# Patient Record
Sex: MALE | Race: OTHER | HISPANIC OR LATINO | URBAN - METROPOLITAN AREA
[De-identification: names, ages, dates, MRNs, and addresses within clinical notes are randomized per-mention and may not be internally consistent; named-entity substitution may affect disease eponyms.]

---

## 2020-03-31 ENCOUNTER — EMERGENCY (EMERGENCY)
Facility: HOSPITAL | Age: 55
LOS: 1 days | Discharge: HOME | End: 2020-03-31
Attending: EMERGENCY MEDICINE
Payer: MEDICAID

## 2020-03-31 VITALS
HEART RATE: 113 BPM | WEIGHT: 199.96 LBS | SYSTOLIC BLOOD PRESSURE: 156 MMHG | TEMPERATURE: 101 F | DIASTOLIC BLOOD PRESSURE: 94 MMHG | OXYGEN SATURATION: 96 % | RESPIRATION RATE: 20 BRPM | HEIGHT: 65 IN

## 2020-03-31 PROCEDURE — 99283 EMERGENCY DEPT VISIT LOW MDM: CPT

## 2020-03-31 NOTE — ED ADULT TRIAGE NOTE - CHIEF COMPLAINT QUOTE
Pt compliant of fever and headache for 5 days. Pt stated he saw his doctor on Saturday for same compliant of fever and headache and he was given medicine but it is not working.

## 2020-04-01 VITALS
HEART RATE: 95 BPM | OXYGEN SATURATION: 96 % | RESPIRATION RATE: 19 BRPM | DIASTOLIC BLOOD PRESSURE: 82 MMHG | SYSTOLIC BLOOD PRESSURE: 125 MMHG | TEMPERATURE: 99 F

## 2020-04-01 PROCEDURE — 99283 EMERGENCY DEPT VISIT LOW MDM: CPT

## 2020-04-01 RX ORDER — ACETAMINOPHEN 500 MG
2 TABLET ORAL
Qty: 30 | Refills: 0
Start: 2020-04-01

## 2020-04-01 RX ORDER — IBUPROFEN 200 MG
600 TABLET ORAL ONCE
Refills: 0 | Status: COMPLETED | OUTPATIENT
Start: 2020-04-01 | End: 2020-04-01

## 2020-04-01 RX ORDER — IBUPROFEN 200 MG
1 TABLET ORAL
Qty: 20 | Refills: 0
Start: 2020-04-01 | End: 2020-04-05

## 2020-04-01 RX ORDER — ACETAMINOPHEN 500 MG
975 TABLET ORAL ONCE
Refills: 0 | Status: COMPLETED | OUTPATIENT
Start: 2020-04-01 | End: 2020-04-01

## 2020-04-01 RX ADMIN — Medication 975 MILLIGRAM(S): at 00:44

## 2020-04-01 RX ADMIN — Medication 600 MILLIGRAM(S): at 00:44

## 2020-04-01 NOTE — ED ADULT NURSE NOTE - OBJECTIVE STATEMENT
The patient presented with body pain, and fever.  He received tylenol and ibuprofen po with relief.  He is breathing at ease with no hypoxia.

## 2020-04-01 NOTE — ED ADULT NURSE NOTE - NSIMPLEMENTINTERV_GEN_ALL_ED
Implemented All Universal Safety Interventions:  [place] to call system. Call bell, personal items and telephone within reach. Instruct patient to call for assistance. Room bathroom lighting operational. Non-slip footwear when patient is off stretcher. Physically safe environment: no spills, clutter or unnecessary equipment. Stretcher in lowest position, wheels locked, appropriate side rails in place.

## 2020-04-01 NOTE — ED PROVIDER NOTE - OBJECTIVE STATEMENT
53 y/o healthy M here with 5 days of fevers, headaches, body aches, and cough.  Pt reports he saw his PMD for these symptoms and was given a medication (does not remember name) which he has been taking without relief.  No known sick contacts.  No recent travel.  Denies vision changes, weakness, neck pain or stiffness, cp, SOB, abd pain, n/v/d.

## 2020-04-01 NOTE — ED PROVIDER NOTE - PATIENT PORTAL LINK FT
You can access the FollowMyHealth Patient Portal offered by [facility] by registering at the following website: [email or web address]. By joining [employer]’s FollowMyHealth portal, you will also be able to view your health information using other applications (apps) compatible with our system.

## 2020-04-01 NOTE — ED PROVIDER NOTE - NSFOLLOWUPINSTRUCTIONS_ED_ALL_ED_FT
Drink plenty of fluids.  You can take ibuprofen 600mg every 6 hours or Tylenol 650mg every 4 hours as needed for pain or fever.  Follow-up with your PMD in 24-48 hours.      You were not tested for Coronavirus today as you do not require medical admission, but given your symptoms it is presumed that you have Covid-19 Coronavirus.  Your oxygen level was normal at rest and with ambulation, which is assuring.  Return to the emergency department for any new or worsening symptoms, including but not limited to inability to catch breath/inability to walk without marked shortness of breath or light-headedness, neck pain/stiffness, confusion, inability to tolerate oral intake, or passing out/fainting.    Please avoid any close or prolonged contact with other individuals, in particular: infants/elderly or individuals who are known to be immune suppressed.  Please self-quarantine and isolate yourself from others for 14 days.      You received verbal instructions and did not sign because of the risk of infection, and expressed understanding of the discharge instructions. Please followup with your doctor (call) and consider follow up in their office. Please practice good hand hygiene and social distancing. If fever, cough, shortness of breath, or any worse symptoms return to the ER.

## 2020-04-01 NOTE — ED PROVIDER NOTE - CLINICAL SUMMARY MEDICAL DECISION MAKING FREE TEXT BOX
53 y/o M with 5 days of headaches, body aches, fever, mild cough.  Pt saw his PMD who prescribed him a medication which he takes twice a day without relief.  No focal neuro deficits, no nuchal rigidity to suggest meningitis.  Will treat headache, likely in setting of viral illness, poss covid given prevalence in community.  Will reassess for outpatient supportive care and self-quarantine.

## 2020-11-16 ENCOUNTER — EMERGENCY (EMERGENCY)
Facility: HOSPITAL | Age: 55
LOS: 1 days | Discharge: HOME | End: 2020-11-16
Attending: EMERGENCY MEDICINE
Payer: MEDICAID

## 2020-11-16 VITALS
HEIGHT: 65 IN | WEIGHT: 192.24 LBS | HEART RATE: 98 BPM | SYSTOLIC BLOOD PRESSURE: 153 MMHG | DIASTOLIC BLOOD PRESSURE: 93 MMHG | OXYGEN SATURATION: 97 % | RESPIRATION RATE: 18 BRPM | TEMPERATURE: 99 F

## 2020-11-16 VITALS
RESPIRATION RATE: 17 BRPM | OXYGEN SATURATION: 97 % | HEART RATE: 87 BPM | DIASTOLIC BLOOD PRESSURE: 87 MMHG | TEMPERATURE: 98 F | SYSTOLIC BLOOD PRESSURE: 143 MMHG

## 2020-11-16 LAB
ALBUMIN SERPL ELPH-MCNC: 3.8 G/DL — SIGNIFICANT CHANGE UP (ref 3.5–5)
ALP SERPL-CCNC: 79 U/L — SIGNIFICANT CHANGE UP (ref 40–120)
ALT FLD-CCNC: 42 U/L DA — SIGNIFICANT CHANGE UP (ref 10–60)
ANION GAP SERPL CALC-SCNC: 9 MMOL/L — SIGNIFICANT CHANGE UP (ref 5–17)
AST SERPL-CCNC: 17 U/L — SIGNIFICANT CHANGE UP (ref 10–40)
BASOPHILS # BLD AUTO: 0.03 K/UL — SIGNIFICANT CHANGE UP (ref 0–0.2)
BASOPHILS NFR BLD AUTO: 0.5 % — SIGNIFICANT CHANGE UP (ref 0–2)
BILIRUB SERPL-MCNC: 0.5 MG/DL — SIGNIFICANT CHANGE UP (ref 0.2–1.2)
BUN SERPL-MCNC: 18 MG/DL — SIGNIFICANT CHANGE UP (ref 7–18)
CALCIUM SERPL-MCNC: 9 MG/DL — SIGNIFICANT CHANGE UP (ref 8.4–10.5)
CHLORIDE SERPL-SCNC: 103 MMOL/L — SIGNIFICANT CHANGE UP (ref 96–108)
CO2 SERPL-SCNC: 26 MMOL/L — SIGNIFICANT CHANGE UP (ref 22–31)
CREAT SERPL-MCNC: 0.86 MG/DL — SIGNIFICANT CHANGE UP (ref 0.5–1.3)
D DIMER BLD IA.RAPID-MCNC: 150 NG/ML DDU — SIGNIFICANT CHANGE UP
EOSINOPHIL # BLD AUTO: 0.39 K/UL — SIGNIFICANT CHANGE UP (ref 0–0.5)
EOSINOPHIL NFR BLD AUTO: 7 % — HIGH (ref 0–6)
GLUCOSE SERPL-MCNC: 101 MG/DL — HIGH (ref 70–99)
HCT VFR BLD CALC: 45.1 % — SIGNIFICANT CHANGE UP (ref 39–50)
HGB BLD-MCNC: 14.8 G/DL — SIGNIFICANT CHANGE UP (ref 13–17)
IMM GRANULOCYTES NFR BLD AUTO: 0.2 % — SIGNIFICANT CHANGE UP (ref 0–1.5)
LYMPHOCYTES # BLD AUTO: 1.88 K/UL — SIGNIFICANT CHANGE UP (ref 1–3.3)
LYMPHOCYTES # BLD AUTO: 33.6 % — SIGNIFICANT CHANGE UP (ref 13–44)
MCHC RBC-ENTMCNC: 29.5 PG — SIGNIFICANT CHANGE UP (ref 27–34)
MCHC RBC-ENTMCNC: 32.8 GM/DL — SIGNIFICANT CHANGE UP (ref 32–36)
MCV RBC AUTO: 89.8 FL — SIGNIFICANT CHANGE UP (ref 80–100)
MONOCYTES # BLD AUTO: 1.03 K/UL — HIGH (ref 0–0.9)
MONOCYTES NFR BLD AUTO: 18.4 % — HIGH (ref 2–14)
NEUTROPHILS # BLD AUTO: 2.25 K/UL — SIGNIFICANT CHANGE UP (ref 1.8–7.4)
NEUTROPHILS NFR BLD AUTO: 40.3 % — LOW (ref 43–77)
NRBC # BLD: 0 /100 WBCS — SIGNIFICANT CHANGE UP (ref 0–0)
PLATELET # BLD AUTO: 243 K/UL — SIGNIFICANT CHANGE UP (ref 150–400)
POTASSIUM SERPL-MCNC: 4.2 MMOL/L — SIGNIFICANT CHANGE UP (ref 3.5–5.3)
POTASSIUM SERPL-SCNC: 4.2 MMOL/L — SIGNIFICANT CHANGE UP (ref 3.5–5.3)
PROT SERPL-MCNC: 7.8 G/DL — SIGNIFICANT CHANGE UP (ref 6–8.3)
RBC # BLD: 5.02 M/UL — SIGNIFICANT CHANGE UP (ref 4.2–5.8)
RBC # FLD: 11.9 % — SIGNIFICANT CHANGE UP (ref 10.3–14.5)
SARS-COV-2 RNA SPEC QL NAA+PROBE: SIGNIFICANT CHANGE UP
SODIUM SERPL-SCNC: 138 MMOL/L — SIGNIFICANT CHANGE UP (ref 135–145)
TROPONIN I SERPL-MCNC: <0.015 NG/ML — SIGNIFICANT CHANGE UP (ref 0–0.04)
WBC # BLD: 5.59 K/UL — SIGNIFICANT CHANGE UP (ref 3.8–10.5)
WBC # FLD AUTO: 5.59 K/UL — SIGNIFICANT CHANGE UP (ref 3.8–10.5)

## 2020-11-16 PROCEDURE — 96374 THER/PROPH/DIAG INJ IV PUSH: CPT

## 2020-11-16 PROCEDURE — 96375 TX/PRO/DX INJ NEW DRUG ADDON: CPT

## 2020-11-16 PROCEDURE — 70450 CT HEAD/BRAIN W/O DYE: CPT | Mod: 26

## 2020-11-16 PROCEDURE — 99284 EMERGENCY DEPT VISIT MOD MDM: CPT

## 2020-11-16 PROCEDURE — 71046 X-RAY EXAM CHEST 2 VIEWS: CPT | Mod: 26

## 2020-11-16 PROCEDURE — 87635 SARS-COV-2 COVID-19 AMP PRB: CPT

## 2020-11-16 PROCEDURE — 85379 FIBRIN DEGRADATION QUANT: CPT

## 2020-11-16 PROCEDURE — 93005 ELECTROCARDIOGRAM TRACING: CPT

## 2020-11-16 PROCEDURE — 71046 X-RAY EXAM CHEST 2 VIEWS: CPT

## 2020-11-16 PROCEDURE — 85025 COMPLETE CBC W/AUTO DIFF WBC: CPT

## 2020-11-16 PROCEDURE — 70450 CT HEAD/BRAIN W/O DYE: CPT

## 2020-11-16 PROCEDURE — 99284 EMERGENCY DEPT VISIT MOD MDM: CPT | Mod: 25

## 2020-11-16 PROCEDURE — 36415 COLL VENOUS BLD VENIPUNCTURE: CPT

## 2020-11-16 PROCEDURE — 84484 ASSAY OF TROPONIN QUANT: CPT

## 2020-11-16 PROCEDURE — 80053 COMPREHEN METABOLIC PANEL: CPT

## 2020-11-16 RX ORDER — KETOROLAC TROMETHAMINE 30 MG/ML
30 SYRINGE (ML) INJECTION ONCE
Refills: 0 | Status: DISCONTINUED | OUTPATIENT
Start: 2020-11-16 | End: 2020-11-16

## 2020-11-16 RX ORDER — MECLIZINE HCL 12.5 MG
25 TABLET ORAL ONCE
Refills: 0 | Status: COMPLETED | OUTPATIENT
Start: 2020-11-16 | End: 2020-11-16

## 2020-11-16 RX ORDER — ACETAMINOPHEN 500 MG
650 TABLET ORAL ONCE
Refills: 0 | Status: COMPLETED | OUTPATIENT
Start: 2020-11-16 | End: 2020-11-16

## 2020-11-16 RX ORDER — METOCLOPRAMIDE HCL 10 MG
10 TABLET ORAL ONCE
Refills: 0 | Status: COMPLETED | OUTPATIENT
Start: 2020-11-16 | End: 2020-11-16

## 2020-11-16 RX ADMIN — Medication 25 MILLIGRAM(S): at 20:05

## 2020-11-16 RX ADMIN — Medication 30 MILLIGRAM(S): at 20:04

## 2020-11-16 RX ADMIN — Medication 650 MILLIGRAM(S): at 17:24

## 2020-11-16 RX ADMIN — Medication 10 MILLIGRAM(S): at 17:25

## 2020-11-16 NOTE — ED PROVIDER NOTE - CHPI ED SYMPTOMS NEG
no changes in vision, no changes in hearing, no shortness of breath, no cough, no vomiting, no diarrhea

## 2020-11-16 NOTE — ED PROVIDER NOTE - NSFOLLOWUPINSTRUCTIONS_ED_ALL_ED_FT
Dolor de pecho inespecífico en los adultos    Nonspecific Chest Pain, Adult      El dolor de pecho puede deberse a muchas afecciones diferentes. Puede ser provocado por brodie afección que es potencialmente mortal y requiere tratamiento hospitalario de inmediato. También puede ser provocado por algo que no es potencialmente mortal. Si tiene dolor de pecho, puede ser difícil saber la diferencia, por lo tanto es importante que obtenga ayuda de inmediato para asegurarse de que no tiene brodie afección grave.  Algunas causas potencialmente mortales del dolor de pecho incluyen:  •Infarto de miocardio.      •Un desgarro en el vaso sanguíneo principal del cuerpo (disección aórtica).      •Inflamación alrededor del corazón (pericarditis).      •Un problema en los pulmones, daquan un coágulo de denise (embolia pulmonar) o un pulmón colapsado (neumotórax).    Algunas causas de dolor de pecho que no son potencialmente mortales incluyen:  •Acidez estomacal.      •Ansiedad o estrés.      •Daño de los huesos, los músculos y los cartílagos que conforman la pared torácica.      •Neumonía o bronquitis.      •Culebrilla (virus de la varicela zóster).    El dolor de pecho puede provocar las siguientes sensaciones:  •Dolor o molestias en la superficie o en lo profundo del pecho.      •Dolor opresivo, continuo o constrictivo.      •Ardor u hormigueo.      •Dolor sordo o intenso que empeora al moverse, toser o inhalar profundamente.      •Dolor o molestias que también se sienten en la espalda, el ramandeep, la mandíbula, el hombro o el brazo, o dolor que se extiende a cualquiera de estas zonas.      El dolor de pecho puede aparecer y desaparecer. También puede ser steven. Gracia médico le hará análisis clínicos y otros estudios para tratar de determinar la causa del dolor. El tratamiento dependerá de la causa del dolor de pecho.      Siga estas indicaciones en gracia casa:    Medicamentos     •[place] los medicamentos de venta victor hugo y los recetados solamente daquan se lo haya indicado el médico.      •Si le recetaron un antibiótico, tómelo o úselo daquan se lo haya indicado el médico. No deje de nakia los antibióticos aunque comience a sentirse mejor.        Estilo de mimi      •Lexie reposo daquan se lo haya indicado el médico.      • No consuma ningún producto que contenga nicotina o tabaco, daquan cigarrillos y cigarrillos electrónicos. Si necesita ayuda para dejar de fumar, consulte al médico.      • No azeb alcohol.    •Opte por un estilo de mimi saludable según lo recomendado. [place] puede incluir lo siguiente:  •Practicar actividad física con regularidad. Pida al médico que le sugiera algunas actividades que luz seguras para usted.      •Seguir brodie dieta cardiosaludable. Esta debe incluir muchas frutas y verduras frescas, cereales integrales, proteínas (magras) con bajo contenido de grasa y productos lácteos bajos en grasa. Un nutricionista podrá ayudarlo a hacer elecciones de alimentación saludables.      •Mantener un peso saludable.      •Controlar cualquier otra afección que tenga, daquan presión arterial susan (hipertensión) o diabetes.      •Disminuir el nivel de estrés; por ejemplo, con yoga o técnicas de relajación.        Indicaciones generales     •Esté atento a cualquier cambio en los síntomas. Informe al médico sobre estos o si tiene síntomas nuevos.      •Evite las actividades que le causen dolor de pecho.      •Concurra a todas las visitas de seguimiento daquan se lo haya indicado el médico. [place] es importante. [place] incluye las visitas para realizarle otros estudios si el dolor de pecho no desaparece.        Comuníquese con un médico si:    •El dolor de pecho no desaparece.      •Se siente deprimido.      •Tiene fiebre.        Solicite ayuda inmediatamente si:    •El dolor en el pecho empeora.      •Tiene tos que empeora o tose con denise.      •Siente un dolor intenso en el abdomen.      •Se desmaya.      •Tiene brodie molestia repentina e inexplicable en el pecho.      •Tiene molestias repentinas e inexplicables en los brazos, la espalda, el ramandeep o la mandíbula.      •Le falta el aire en cualquier momento.      •Comienza a sudar de manera repentina o la piel se le humedece.      •Siente náuseas o vomita.      •Se siente repentinamente mareado o se desmaya.      •Tiene debilidad intensa, o debilidad o fatiga sin explicación.      •Siente que el corazón comienza a latir rápidamente o que se saltea latidos.      Estos síntomas pueden representar un problema grave que constituye brodie emergencia. No espere a brielle si los síntomas desaparecen. Solicite atención médica de inmediato. Comuníquese con el servicio de emergencias de gracia localidad (911 en los [place]). No conduzca por jonn propios medios hasta el hospital.       Resumen    •El dolor de pecho puede ser provocado por brodie afección que es grave y requiere tratamiento urgente. También puede ser provocado por algo que no es potencialmente mortal.      •Si tiene dolor de pecho, es muy importante que consulte con el médico. El médico podrá hacerle análisis clínicos y otros estudios para tratar de determinar la causa del dolor.      •Siga las indicaciones de gracia médico sobre nakia medicamentos, hacer cambios en gracia estilo de mimi y recibir tratamiento de urgencia si los síntomas empeoran.      •Concurra a todas las visitas de seguimiento daquan se lo haya indicado el médico. [place] incluye las visitas para realizarle otros estudios si el dolor de pecho no desaparece.      Esta información no tiene daquan fin reemplazar el consejo del médico. Asegúrese de hacerle al médico cualquier pregunta que tenga. Dolor de ava general sin causa    General Headache Without Cause      El dolor de ava es un dolor o malestar que se siente en la jimmie de la ava o del ramandeep. Hay muchas causas y tipos de mine de ava. En algunos casos, es posible que no se encuentre la causa.      Siga estas indicaciones en gracia casa:    Controle gracia afección para detectar cualquier cambio. Infórmele a rgacia médico acerca de los cambios. Siga estos pasos para controlar gracia afección:      Control del dolor                   •[place] los medicamentos de venta victor hugo y los recetados solamente daquan se lo haya indicado el médico.      •Cuando sienta dolor de ava acuéstese en un cuarto oscuro y tranquilo.    •Si se lo indican, aplíquese hielo en la ava y en la jimmie del ramandeep:  •Ponga el hielo en brodie bolsa plástica.      •Coloque brodie toalla entre la piel y la bolsa.      •Coloque el hielo stanley 20 minutos, 2 a 3 veces al día.      •Si se lo indican, aplique calor en la jimmie afectada. Use la gwyn de calor que el médico le recomiende, daquan brodie compresa de calor húmedo o brodie almohadilla térmica.   • Coloque brodie toalla entre la piel y la gwyn de calor.       •Aplique calor stanley 20 a 30 minutos.       •Retire la gwyn de calor si la piel se pone de color causey brillante. [place] es muy importante si no puede sentir dolor, calor o frío. Puede correr un riesgo mayor de sufrir quemaduras.        •Mantenga las luces tenues si las luces brillantes le molestan o kyleigh mine de ava empeoran.      Comida y bebida     •Mantenga un horario para las comidas.    •Si juan josé alcohol: •Limite la cantidad que juan josé a lo siguiente:   •De 0 a 1 medida por día para las mujeres.       • De 0 a 2 medidas por día para los hombres.         •Esté atento a la cantidad de alcohol que hay en las bebidas que sampson. En los [place], brodie medida equivale a brodie botella de cerveza de 12 oz (355 ml), un vaso de vino de 5 oz (148 ml) o un vaso de brodie bebida alcohólica de susan graduación de 1½ oz (44 ml).        •Deje de nakia cafeína o reduzca la cantidad que consume.        Indicaciones generales    •Lleve un registro diario para averiguar si ciertas cosas provocan los mine de ava. Registre, por ejemplo, lo siguiente:  •Lo que usted come y juan josé.      •El tiempo que duerme.      •Algún cambio en gracia dieta o en los medicamentos.        •Hágase masajes o pruebe otras formas de relajarse.      •Limite el estrés.      •Siéntese con la espalda recta. No contraiga (tensione) los músculos.      • No consuma ningún producto que contenga nicotina o tabaco. Estos incluyen los cigarrillos, el tabaco para mascar y los cigarrillos electrónicos. Si necesita ayuda para dejar de fumar, consulte al médico.      •Lexie ejercicios con regularidad poonam daquan se lo indicó el médico.      •Duerma lo suficiente. [place] a menudo significa entre 7 y 9 horas de sueño cada noche.      •Concurra a todas las visitas de control daquan se lo haya indicado el médico. [place] es importante.        Comuníquese con un médico si:    •Los medicamentos no logran aliviar los síntomas.      •Tiene un dolor de ava que es diferente a los otros mine de ava.      •Tiene malestar estomacal (náuseas) o vomita.      •Tiene fiebre.        Solicite ayuda inmediatamente si:    •El dolor de ava empeora rápidamente.      •El dolor empeora después de hacer mucha actividad física.      •Sigue vomitando.      •Presenta rigidez en el ramandeep.      •Tiene dificultad para brielle.      •Tiene dificultad para hablar.      •Siente dolor en el kenny o en el oído.      •Kyleigh músculos están débiles, o pierde el control muscular.      •Pierde el equilibrio o tiene problemas para caminar.      •Siente que va a desvanecerse (perder el conocimiento) o se desmaya.      •Está desorientado (confundido).      •Tiene brodie convulsión.        Resumen    •El dolor de ava es un dolor o malestar que se siente en la jimmie de la ava o del ramandeep.      •Hay muchas causas y tipos de mine de ava. En algunos casos, es posible que no se encuentre la causa.      •Lleve un diario daquan ayuda para determinar la causa de los mine de ava. Controle gracia afección para detectar cualquier cambio. Infórmele a gracia médico acerca de los cambios.      •Comuníquese con un médico si tiene un dolor de ava que es diferente de lo habitual o si el dolor de ava no se cassi con los medicamentos.      •Solicite ayuda de inmediato si el dolor de ava es muy intenso, vomita, tiene dificultad para brielle, pierde el equilibrio o tiene brodie convulsión.      Esta información no tiene daquan fin reemplazar el consejo del médico. Asegúrese de hacerle al médico cualquier pregunta que tenga.    Dolor de pecho inespecífico en los adultos    Nonspecific Chest Pain, Adult      El dolor de pecho puede deberse a muchas afecciones diferentes. Puede ser provocado por brodie afección que es potencialmente mortal y requiere tratamiento hospitalario de inmediato. También puede ser provocado por algo que no es potencialmente mortal. Si tiene dolor de pecho, puede ser difícil saber la diferencia, por lo tanto es importante que obtenga ayuda de inmediato para asegurarse de que no tiene brodie afección grave.  Algunas causas potencialmente mortales del dolor de pecho incluyen:  •Infarto de miocardio.      •Un desgarro en el vaso sanguíneo principal del cuerpo (disección aórtica).      •Inflamación alrededor del corazón (pericarditis).      •Un problema en los pulmones, daquan un coágulo de denise (embolia pulmonar) o un pulmón colapsado (neumotórax).    Algunas causas de dolor de pecho que no son potencialmente mortales incluyen:  •Acidez estomacal.      •Ansiedad o estrés.      •Daño de los huesos, los músculos y los cartílagos que conforman la pared torácica.      •Neumonía o bronquitis.      •Culebrilla (virus de la varicela zóster).    El dolor de pecho puede provocar las siguientes sensaciones:  •Dolor o molestias en la superficie o en lo profundo del pecho.      •Dolor opresivo, continuo o constrictivo.      •Ardor u hormigueo.      •Dolor sordo o intenso que empeora al moverse, toser o inhalar profundamente.      •Dolor o molestias que también se sienten en la espalda, el ramandeep, la mandíbula, el hombro o el brazo, o dolor que se extiende a cualquiera de estas zonas.      El dolor de pecho puede aparecer y desaparecer. También puede ser steven. Gracia médico le hará análisis clínicos y otros estudios para tratar de determinar la causa del dolor. El tratamiento dependerá de la causa del dolor de pecho.      Siga estas indicaciones en gracia casa:    Medicamentos     •[place] los medicamentos de venta victor hugo y los recetados solamente daquan se lo haya indicado el médico.      •Si le recetaron un antibiótico, tómelo o úselo daquan se lo haya indicado el médico. No deje de nakia los antibióticos aunque comience a sentirse mejor.        Estilo de mimi      •[place] reposo daquan se lo haya indicado el médico.      • No consuma ningún producto que contenga nicotina o tabaco, daquan cigarrillos y cigarrillos electrónicos. Si necesita ayuda para dejar de fumar, consulte al médico.      • No zaeb alcohol.    •Opte por un estilo de mimi saludable según lo recomendado. [place] puede incluir lo siguiente:  •Practicar actividad física con regularidad. Pida al médico que le sugiera algunas actividades que luz seguras para usted.      •Seguir brodie dieta cardiosaludable. Esta debe incluir muchas frutas y verduras frescas, cereales integrales, proteínas (magras) con bajo contenido de grasa y productos lácteos bajos en grasa. Un nutricionista podrá ayudarlo a hacer elecciones de alimentación saludables.      •Mantener un peso saludable.      •Controlar cualquier otra afección que tenga, daquan presión arterial susan (hipertensión) o diabetes.      •Disminuir el nivel de estrés; por ejemplo, con yoga o técnicas de relajación.        Indicaciones generales     •Esté atento a cualquier cambio en los síntomas. Informe al médico sobre estos o si tiene síntomas nuevos.      •Evite las actividades que le causen dolor de pecho.      •Concurra a todas las visitas de seguimiento daquan se lo haya indicado el médico. [place] es importante. [place] incluye las visitas para realizarle otros estudios si el dolor de pecho no desaparece.        Comuníquese con un médico si:    •El dolor de pecho no desaparece.      •Se siente deprimido.      •Tiene fiebre.        Solicite ayuda inmediatamente si:    •El dolor en el pecho empeora.      •Tiene tos que empeora o tose con denise.      •Siente un dolor intenso en el abdomen.      •Se desmaya.      •Tiene brodie molestia repentina e inexplicable en el pecho.      •Tiene molestias repentinas e inexplicables en los brazos, la espalda, el ramandeep o la mandíbula.      •Le falta el aire en cualquier momento.      •Comienza a sudar de manera repentina o la piel se le humedece.      •Siente náuseas o vomita.      •Se siente repentinamente mareado o se desmaya.      •Tiene debilidad intensa, o debilidad o fatiga sin explicación.      •Siente que el corazón comienza a latir rápidamente o que se saltea latidos.      Estos síntomas pueden representar un problema grave que constituye brodie emergencia. No espere a brielle si los síntomas desaparecen. Solicite atención médica de inmediato. Comuníquese con el servicio de emergencias de gracia localidad (911 en los [place]). No conduzca por kyleigh propios medios hasta el hospital.       Resumen    •El dolor de pecho puede ser provocado por brodie afección que es grave y requiere tratamiento urgente. También puede ser provocado por algo que no es potencialmente mortal.      •Si tiene dolor de pecho, es muy importante que consulte con el médico. El médico podrá hacerle análisis clínicos y otros estudios para tratar de determinar la causa del dolor.      •Siga las indicaciones de gracia médico sobre nakia medicamentos, hacer cambios en gracia estilo de mimi y recibir tratamiento de urgencia si los síntomas empeoran.      •Concurra a todas las visitas de seguimiento daquan se lo haya indicado el médico. [place] incluye las visitas para realizarle otros estudios si el dolor de pecho no desaparece.      Esta información no tiene daquan fin reemplazar el consejo del médico. Asegúrese de hacerle al médico cualquier pregunta que tenga.

## 2020-11-16 NOTE — ED ADULT NURSE NOTE - OBJECTIVE STATEMENT
Pain in my chest, head and back, started Friday afternoon associated with dizziness and blurry vision. Denies nausea, vomiting. At present time patient c/o pain 8/10 back, 7/10 chest and head. Patient is lying on stretcher in no acute distress, with no difficulty breathing.

## 2020-11-16 NOTE — ED PROVIDER NOTE - OBJECTIVE STATEMENT
54 y/o M patient with no significant PMHx and no significant PSHx presents to the ED with c/o chest pain, headache x 3-4d. Patient states having intermittent frontal and retro-orbital headache. Patient reports having symptoms of dizziness that is described as vertigo, intermittent chest pain. Patient denies any changes in vision, changes in hearing, shortness of breath, cough, vomiting, diarrhea, or any other complains.

## 2020-11-16 NOTE — ED ADULT NURSE NOTE - NSIMPLEMENTINTERV_GEN_ALL_ED
Implemented All Universal Safety Interventions:  [place] to call system. Call bell, personal items and telephone within reach. Instruct patient to call for assistance. Room bathroom lighting operational. Non-slip footwear when patient is off stretcher. Physically safe environment: no spills, clutter or unnecessary equipment. Stretcher in lowest position, wheels locked, appropriate side rails in place.

## 2020-11-16 NOTE — ED PROVIDER NOTE - PHYSICAL EXAMINATION
Vital Signs Reviewed  GEN: Comfortable, NAD, AAOx3  HEENT: NCAT, EOMI, MMM, Neck Supple  RESP: CTAB, No rales/rhonchi/wheezing  CV: RRR, S1S2, No murmurs  ABD: No TTP, ND, No masses, No CVA Tenderness  Extrem/Skin: Equal pulses bilat, No cyanosis/edema/rashes  Neuro: No focal deficits General: pt lying in stretcher, appears stated age and is not in distress, speaking in full clear sentences  HEENT: AT/NC, pink conjunctiva, anicteric sclerae, EOMI, PERRLA, mmm   Neck: supple, full ROM, trachea midline, no JVD, no cervical LAD, no midline ttp or stepoffs  Lungs: symmetric excursion, b/l clear vesicular breath sounds with no wheezes, crackles, or rhonchi  Heart: rrr, S1, S2 normal; no S3 or S4; no murmurs or rubs  Abd: normal bowel sounds; soft, nontender; negative McBurney's point tenderness, negative Lopez's sign, no rebound, no guarding, spleen non-palpable; no hepatomegaly, no masses  Back: no midline spinal tenderness or stepoffs, no costovertebral angle tenderness  Extremities: no clubbing, cyanosis, or edema; no palpable deformities or fractures  Skin: good turgor; no rashes, petechiae, ecchymoses, or jaundice  Pulses: radial, posterior tibialis (PT), dorsalis pedis (DP) all 2+ & symmetric  Neuro: awake, alert, responsive; oriented to person, place and time; cranial nerves intact, EOMI, intact jaw movement, intact facial sensation, no facial asymmetry, hearing intact; ; Motor: Normal tone in upper and lower extremities bilaterally strength 5/5; Sensory: intact; normal steady gait;

## 2020-11-16 NOTE — ED PROVIDER NOTE - CLINICAL SUMMARY MEDICAL DECISION MAKING FREE TEXT BOX
Pt w/ aforementioned presentation with a heart score of 1 making the risk of MACE less than 1.7% and negative D-dimer and wells score of 0 making patient low risk for PE and unremarkable CXR, no neuro deficits and no nuchal rigidity on exam or neuro findings to suggest meningitis or ICH. Will check labs, give meds, get imaging, monitor and reassess.

## 2021-02-03 NOTE — ED PROVIDER NOTE - DOMESTIC TRAVEL HIGH RISK QUESTION
Bed: O37  Expected date:   Expected time:   Means of arrival: [facility]  Comments:  47M ROSC   2mg narcan, 3 epi, 300 amiodarone  115/85 90   Agonal, BVM- IGEL airway    No

## 2021-02-17 NOTE — ED ADULT NURSE NOTE - NS ED PATIENT SAFETY CONCERN
Writer provided and reviewed AVS w/ pt. Emphasize lab draw for the 19th and 22nd, also educated on Warfarin medication. Pt went home w/ belongings CPAP, jacket, pants, shirts, socks, shoes, phone, , glasses, medications. RN answered all questions pt had.     No

## 2022-05-10 ENCOUNTER — APPOINTMENT (OUTPATIENT)
Dept: SURGERY | Facility: CLINIC | Age: 57
End: 2022-05-10
Payer: MEDICAID

## 2022-05-10 PROCEDURE — 99203 OFFICE O/P NEW LOW 30 MIN: CPT

## 2022-05-16 PROBLEM — Z78.9 OTHER SPECIFIED HEALTH STATUS: Chronic | Status: ACTIVE | Noted: 2020-11-16

## 2022-05-19 PROBLEM — Z00.00 ENCOUNTER FOR PREVENTIVE HEALTH EXAMINATION: Status: ACTIVE | Noted: 2022-05-19

## 2023-06-25 ENCOUNTER — EMERGENCY (EMERGENCY)
Facility: HOSPITAL | Age: 58
LOS: 1 days | Discharge: HOME | End: 2023-06-25
Attending: EMERGENCY MEDICINE
Payer: COMMERCIAL

## 2023-06-25 VITALS
DIASTOLIC BLOOD PRESSURE: 90 MMHG | HEART RATE: 81 BPM | SYSTOLIC BLOOD PRESSURE: 142 MMHG | TEMPERATURE: 98 F | OXYGEN SATURATION: 96 % | RESPIRATION RATE: 18 BRPM

## 2023-06-25 VITALS
RESPIRATION RATE: 18 BRPM | SYSTOLIC BLOOD PRESSURE: 113 MMHG | TEMPERATURE: 98 F | HEART RATE: 71 BPM | OXYGEN SATURATION: 98 % | DIASTOLIC BLOOD PRESSURE: 76 MMHG

## 2023-06-25 LAB
ALBUMIN SERPL ELPH-MCNC: 3.7 G/DL — SIGNIFICANT CHANGE UP (ref 3.5–5)
ALP SERPL-CCNC: 73 U/L — SIGNIFICANT CHANGE UP (ref 40–120)
ALT FLD-CCNC: 31 U/L DA — SIGNIFICANT CHANGE UP (ref 10–60)
ANION GAP SERPL CALC-SCNC: 7 MMOL/L — SIGNIFICANT CHANGE UP (ref 5–17)
APTT BLD: 30.2 SEC — SIGNIFICANT CHANGE UP (ref 27.5–35.5)
AST SERPL-CCNC: 8 U/L — LOW (ref 10–40)
BASOPHILS # BLD AUTO: 0.06 K/UL — SIGNIFICANT CHANGE UP (ref 0–0.2)
BASOPHILS NFR BLD AUTO: 0.7 % — SIGNIFICANT CHANGE UP (ref 0–2)
BILIRUB SERPL-MCNC: 0.3 MG/DL — SIGNIFICANT CHANGE UP (ref 0.2–1.2)
BUN SERPL-MCNC: 15 MG/DL — SIGNIFICANT CHANGE UP (ref 7–18)
CALCIUM SERPL-MCNC: 8.8 MG/DL — SIGNIFICANT CHANGE UP (ref 8.4–10.5)
CHLORIDE SERPL-SCNC: 106 MMOL/L — SIGNIFICANT CHANGE UP (ref 96–108)
CO2 SERPL-SCNC: 26 MMOL/L — SIGNIFICANT CHANGE UP (ref 22–31)
CREAT SERPL-MCNC: 0.98 MG/DL — SIGNIFICANT CHANGE UP (ref 0.5–1.3)
EGFR: 91 ML/MIN/1.73M2 — SIGNIFICANT CHANGE UP
EOSINOPHIL # BLD AUTO: 0.09 K/UL — SIGNIFICANT CHANGE UP (ref 0–0.5)
EOSINOPHIL NFR BLD AUTO: 1 % — SIGNIFICANT CHANGE UP (ref 0–6)
GLUCOSE SERPL-MCNC: 114 MG/DL — HIGH (ref 70–99)
HCT VFR BLD CALC: 47.1 % — SIGNIFICANT CHANGE UP (ref 39–50)
HGB BLD-MCNC: 15.4 G/DL — SIGNIFICANT CHANGE UP (ref 13–17)
IMM GRANULOCYTES NFR BLD AUTO: 0.9 % — SIGNIFICANT CHANGE UP (ref 0–0.9)
INR BLD: 0.94 RATIO — SIGNIFICANT CHANGE UP (ref 0.88–1.16)
LYMPHOCYTES # BLD AUTO: 1.74 K/UL — SIGNIFICANT CHANGE UP (ref 1–3.3)
LYMPHOCYTES # BLD AUTO: 20 % — SIGNIFICANT CHANGE UP (ref 13–44)
MCHC RBC-ENTMCNC: 29.4 PG — SIGNIFICANT CHANGE UP (ref 27–34)
MCHC RBC-ENTMCNC: 32.7 GM/DL — SIGNIFICANT CHANGE UP (ref 32–36)
MCV RBC AUTO: 90.1 FL — SIGNIFICANT CHANGE UP (ref 80–100)
MONOCYTES # BLD AUTO: 0.79 K/UL — SIGNIFICANT CHANGE UP (ref 0–0.9)
MONOCYTES NFR BLD AUTO: 9.1 % — SIGNIFICANT CHANGE UP (ref 2–14)
NEUTROPHILS # BLD AUTO: 5.92 K/UL — SIGNIFICANT CHANGE UP (ref 1.8–7.4)
NEUTROPHILS NFR BLD AUTO: 68.3 % — SIGNIFICANT CHANGE UP (ref 43–77)
NRBC # BLD: 0 /100 WBCS — SIGNIFICANT CHANGE UP (ref 0–0)
PLATELET # BLD AUTO: 268 K/UL — SIGNIFICANT CHANGE UP (ref 150–400)
POTASSIUM SERPL-MCNC: 4.4 MMOL/L — SIGNIFICANT CHANGE UP (ref 3.5–5.3)
POTASSIUM SERPL-SCNC: 4.4 MMOL/L — SIGNIFICANT CHANGE UP (ref 3.5–5.3)
PROT SERPL-MCNC: 7.8 G/DL — SIGNIFICANT CHANGE UP (ref 6–8.3)
PROTHROM AB SERPL-ACNC: 11.2 SEC — SIGNIFICANT CHANGE UP (ref 10.5–13.4)
RBC # BLD: 5.23 M/UL — SIGNIFICANT CHANGE UP (ref 4.2–5.8)
RBC # FLD: 12.4 % — SIGNIFICANT CHANGE UP (ref 10.3–14.5)
SODIUM SERPL-SCNC: 139 MMOL/L — SIGNIFICANT CHANGE UP (ref 135–145)
WBC # BLD: 8.68 K/UL — SIGNIFICANT CHANGE UP (ref 3.8–10.5)
WBC # FLD AUTO: 8.68 K/UL — SIGNIFICANT CHANGE UP (ref 3.8–10.5)

## 2023-06-25 PROCEDURE — 72125 CT NECK SPINE W/O DYE: CPT | Mod: 26,MA

## 2023-06-25 PROCEDURE — 70450 CT HEAD/BRAIN W/O DYE: CPT | Mod: MA

## 2023-06-25 PROCEDURE — 85730 THROMBOPLASTIN TIME PARTIAL: CPT

## 2023-06-25 PROCEDURE — 70450 CT HEAD/BRAIN W/O DYE: CPT | Mod: 26,MA

## 2023-06-25 PROCEDURE — 99284 EMERGENCY DEPT VISIT MOD MDM: CPT

## 2023-06-25 PROCEDURE — 36415 COLL VENOUS BLD VENIPUNCTURE: CPT

## 2023-06-25 PROCEDURE — 96374 THER/PROPH/DIAG INJ IV PUSH: CPT

## 2023-06-25 PROCEDURE — 80053 COMPREHEN METABOLIC PANEL: CPT

## 2023-06-25 PROCEDURE — 85610 PROTHROMBIN TIME: CPT

## 2023-06-25 PROCEDURE — 99284 EMERGENCY DEPT VISIT MOD MDM: CPT | Mod: 25

## 2023-06-25 PROCEDURE — 72131 CT LUMBAR SPINE W/O DYE: CPT | Mod: 26,MA

## 2023-06-25 PROCEDURE — 85025 COMPLETE CBC W/AUTO DIFF WBC: CPT

## 2023-06-25 PROCEDURE — 99053 MED SERV 10PM-8AM 24 HR FAC: CPT

## 2023-06-25 PROCEDURE — 72131 CT LUMBAR SPINE W/O DYE: CPT | Mod: MA

## 2023-06-25 PROCEDURE — 72125 CT NECK SPINE W/O DYE: CPT | Mod: MA

## 2023-06-25 RX ORDER — KETOROLAC TROMETHAMINE 30 MG/ML
15 SYRINGE (ML) INJECTION ONCE
Refills: 0 | Status: DISCONTINUED | OUTPATIENT
Start: 2023-06-25 | End: 2023-06-25

## 2023-06-25 RX ADMIN — Medication 15 MILLIGRAM(S): at 07:15

## 2023-06-25 NOTE — ED PROVIDER NOTE - NSFOLLOWUPINSTRUCTIONS_ED_ALL_ED_FT
Musculoskeletal Pain  Musculoskeletal pain refers to aches and pains in your bones, joints, muscles, and the tissues that surround them. This pain can occur in any part of the body. It can last for a short time (acute) or a long time (chronic).    A physical exam, lab tests, and imaging studies may be done to find the cause of your musculoskeletal pain.    Follow these instructions at home:  Lifestyle    Try to control or lower your stress levels. Stress increases muscle tension and can worsen musculoskeletal pain. It is important to recognize when you are anxious or stressed and learn ways to manage it. This may include:  Meditation or yoga.  Cognitive or behavioral therapy.  Acupuncture or massage therapy.  You may continue all activities unless the activities cause more pain. When the pain gets better, slowly resume your normal activities. Gradually increase the intensity and duration of your activities or exercise.  Managing pain, stiffness, and swelling        Treatment may include medicines for pain and inflammation that are taken by mouth or applied to the skin. Take over-the-counter and prescription medicines only as told by your health care provider.  When your pain is severe, bed rest may be helpful. Lie or sit in any position that is comfortable, but get out of bed and walk around at least every couple of hours.  If directed, apply heat to the affected area as often as told by your health care provider. Use the heat source that your health care provider recommends, such as a moist heat pack or a heating pad.  Place a towel between your skin and the heat source.  Leave the heat on for 20–30 minutes.  Remove the heat if your skin turns bright red. This is especially important if you are unable to feel pain, heat, or cold. You may have a greater risk of getting burned.  If directed, put ice on the painful area. To do this:  Put ice in a plastic bag.  Place a towel between your skin and the bag.  Leave the ice on for 20 minutes, 2–3 times a day.  Remove the ice if your skin turns bright red. This is very important. If you cannot feel pain, heat, or cold, you have a greater risk of damage to the area.  General instructions    Your health care provider may recommend that you see a physical therapist. This person can help you come up with a safe exercise program.  If told by your health care provider, do physical therapy exercises to improve movement and strength in the affected area.  Keep all follow-up visits. This is important. This includes any physical therapy visits.  Contact a health care provider if:  Your pain gets worse.  Medicines do not help ease your pain.  You cannot use the part of your body that hurts, such as your arm, leg, or neck.  You have trouble sleeping.  You have trouble doing your normal activities.  Get help right away if:  You have a new injury and your pain is worse or different.  You feel numb or you have tingling in the painful area.  Summary  Musculoskeletal pain refers to aches and pains in your bones, joints, muscles, and the tissues that surround them.  This pain can occur in any part of the body.  Your health care provider may recommend that you see a physical therapist. This person can help you come up with a safe exercise program. Do any exercises as told by your physical therapist.  Lower your stress level. Stress can worsen musculoskeletal pain. Ways to lower stress may include meditation, yoga, cognitive or behavioral therapy, acupuncture, and massage therapy.  This information is not intended to replace advice given to you by your health care provider. Make sure you discuss any questions you have with your health care provider.    Document Revised: 04/22/2021 Document Reviewed: 03/31/2021
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
## 2023-06-25 NOTE — ED PROVIDER NOTE - PATIENT PORTAL LINK FT
You can access the FollowMyHealth Patient Portal offered by [facility] by registering at the following website: [email or web address]. By joining [employer]’s FollowMyHealth portal, you will also be able to view your health information using other applications (apps) compatible with our system.

## 2023-06-25 NOTE — ED ADULT NURSE NOTE - ED STAT RN HANDOFF DETAILS 2
Patient AxOx4 well appearing, D /C home as per MD order with further PCP follow up, instruction provided, left ED with steady gait.
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
## 2023-06-25 NOTE — ED ADULT NURSE NOTE - NSFALLUNIVINTERV_ED_ALL_ED
Bed/Stretcher in lowest position, wheels locked, appropriate side rails in place/Call bell, personal items and telephone in reach/Instruct patient to call for assistance before getting out of bed/chair/stretcher/Non-slip footwear applied when patient is off stretcher/[place] to call system/Physically safe environment - no spills, clutter or unnecessary equipment/Purposeful proactive rounding/Room/bathroom lighting operational, light cord in reach

## 2023-06-25 NOTE — ED PROVIDER NOTE - PHYSICAL EXAMINATION
Exam:  General: Patient well appearing, vital signs within normal limits  HEENT: airway patent with moist mucous membranes, no notable facial trauma  Cardiac: RRR S1/S2 with strong peripheral pulses  Respiratory: lungs clear without respiratory distress  GI: abdomen soft, non tender, non distended  Neuro: no gross neurologic deficits, strength 5/5 upper and lower extremities, sensation to light touch intact  MSK: midline C7 point tenderness, diffuse lumbar tenderness  Skin: warm, well perfused  Psych: normal mood and affect

## 2023-07-25 NOTE — ED PROVIDER NOTE - PATIENT PORTAL LINK FT
Pt arrived to unit without complaint, tolerated IVIG without incident. Denies need for AVS, aware of upcoming appointments.
You can access the FollowMyHealth Patient Portal offered by [facility] by registering at the following website: [email or web address]. By joining [employer]’s FollowMyHealth portal, you will also be able to view your health information using other applications (apps) compatible with our system.
